# Patient Record
Sex: FEMALE | Race: OTHER | HISPANIC OR LATINO | URBAN - METROPOLITAN AREA
[De-identification: names, ages, dates, MRNs, and addresses within clinical notes are randomized per-mention and may not be internally consistent; named-entity substitution may affect disease eponyms.]

---

## 2021-06-03 ENCOUNTER — EMERGENCY (EMERGENCY)
Facility: HOSPITAL | Age: 46
LOS: 1 days | End: 2021-06-03
Attending: EMERGENCY MEDICINE
Payer: MEDICAID

## 2021-06-03 VITALS
OXYGEN SATURATION: 99 % | WEIGHT: 138.89 LBS | RESPIRATION RATE: 16 BRPM | SYSTOLIC BLOOD PRESSURE: 109 MMHG | HEART RATE: 74 BPM | TEMPERATURE: 98 F | HEIGHT: 57.87 IN | DIASTOLIC BLOOD PRESSURE: 69 MMHG

## 2021-06-03 VITALS
OXYGEN SATURATION: 98 % | HEART RATE: 79 BPM | RESPIRATION RATE: 18 BRPM | DIASTOLIC BLOOD PRESSURE: 67 MMHG | SYSTOLIC BLOOD PRESSURE: 111 MMHG | TEMPERATURE: 98 F

## 2021-06-03 PROCEDURE — 99283 EMERGENCY DEPT VISIT LOW MDM: CPT | Mod: 25

## 2021-06-03 PROCEDURE — 99283 EMERGENCY DEPT VISIT LOW MDM: CPT

## 2021-06-03 PROCEDURE — 73140 X-RAY EXAM OF FINGER(S): CPT | Mod: 26,LT

## 2021-06-03 PROCEDURE — 73140 X-RAY EXAM OF FINGER(S): CPT

## 2021-06-03 RX ORDER — IBUPROFEN 200 MG
600 TABLET ORAL ONCE
Refills: 0 | Status: COMPLETED | OUTPATIENT
Start: 2021-06-03 | End: 2021-06-03

## 2021-06-03 RX ORDER — CEPHALEXIN 500 MG
1 CAPSULE ORAL
Qty: 28 | Refills: 0
Start: 2021-06-03 | End: 2021-06-09

## 2021-06-03 RX ORDER — CEPHALEXIN 500 MG
500 CAPSULE ORAL
Refills: 0 | Status: DISCONTINUED | OUTPATIENT
Start: 2021-06-03 | End: 2021-06-07

## 2021-06-03 RX ADMIN — Medication 500 MILLIGRAM(S): at 23:46

## 2021-06-03 RX ADMIN — Medication 600 MILLIGRAM(S): at 23:46

## 2021-06-03 NOTE — ED PROVIDER NOTE - ATTENDING CONTRIBUTION TO CARE
46 yo female mostly Zimbabwean speaking presents to the ER for evaluation of left 5th digit wound with initial paronychia treated with acid to the site because she thought it was a fungal infection but it was red and swollen, now with eschar over site, no tenderness to fat pad, no swelling, from, films with no bony involvment to dc on abx with outpt hand follow up. 46 yo female mostly Burmese speaking presents to the ER for evaluation of left 5th digit wound with initial paronychia treated with acid to the site because she thought it was a fungal infection but it was red and swollen, now with eschar over site, no tenderness to fat pad, no swelling, from, films with no bony involvement to dc on abx with outpt hand follow up.

## 2021-06-03 NOTE — ED ADULT NURSE NOTE - OBJECTIVE STATEMENT
patient is a 46 y/o F with hx of hypothyroidism who presents to the ED c/o worsening finger infection/pain over the past 5 days. patient states that she noticed 5 days ago a "bubble" on her finger that she believed was a fungal infection that she began self medicating with "an acid treatment from my country". patient is a 44 y/o F with hx of hypothyroidism who presents to the ED c/o worsening finger infection/pain over the past 5 days. patient states that she noticed 5 days ago a "bubble" on her finger that she believed was a fungal infection that she began self medicating with "an acid treatment from my country". leon patient is a 46 y/o F with hx of hypothyroidism who presents to the ED c/o worsening finger infection/pain over the past 5 days. patient states that she noticed 5 days ago a "bubble" on her finger that she believed was a fungal infection that she began self medicating with "an acid treatment from my [place]" that she left soaking on her finger for "awhile". patient also states that she took a few doses of tetracycline that she "just had around the house". patient was wound noted to her left 5th finger that has small area of black tissue surrounded by redness. patient rates pain as 8/10.   patient is a&ox4, PERRL. strong peripheral pulses, strong extremities x4. ambulatory with steady gait. respirations even and unlabored with symmetrical chest rise. abdomen soft, nondistended, nontender. skin otherwise intact  patient denies CP, SOB, h/a, dizziness, weakness, numbness/tingling, vision changes, abd pain, n/v/d/c, urinary symptoms, cough, fever, chills, recent travel, or sick contacts

## 2021-06-03 NOTE — ED PROVIDER NOTE - NSFOLLOWUPINSTRUCTIONS_ED_ALL_ED_FT
1. Follow up with your primary care physician within 2-3days for reevaluation.  2.  Return to the Emergency Department for worsening, progressive or any other concerning symptoms.   3. Take keflex 500 mg 4x a day-  4. -- Please use 650mg Tylenol (also called acetaminophen) every 4 hours & 600mg Motrin (also called Advil or ibuprofen) every 6 hours as needed for pain/discomfort/swelling. You can get these without a prescription. Don't use more than 3500mg of Tylenol in any 24-hour period. Make sure your other prescription/over-the-counter medications don't contain any Tylenol so you don't take too much. If you have any stomach discomfort while taking Motrin, you can use TUMS or Pepcid or Zantac (these can all be bought without a prescription). 1. Follow up with your primary care physician within 2-3days for reevaluation.  2.  Return to the Emergency Department for worsening, progressive or any other concerning symptoms.   3. Take keflex 500 mg 4x a day-  4. -- Please use 650mg Tylenol (also called acetaminophen) every 4 hours & 600mg Motrin (also called Advil or ibuprofen) every 6 hours as needed for pain/discomfort/swelling. You can get these without a prescription. Don't use more than 3500mg of Tylenol in any 24-hour period. Make sure your other prescription/over-the-counter medications don't contain any Tylenol so you don't take too much. If you have any stomach discomfort while taking Motrin, you can use TUMS or Pepcid or Zantac (these can all be bought without a prescription).    follow up with dr Natali Juares    [facility] Physician [facility] [address] [address], [place], [address] [phone]

## 2021-06-03 NOTE — ED PROVIDER NOTE - OBJECTIVE STATEMENT
44 yo female mostly Belarusian speaking presents to the ER for evaluation of left 5th digit wound.   used and pt states that she used acid from her country because she thought it would help her finger.  Pt reports moderate throbbing pain to distal phalynx. with partial thickness burn.

## 2021-06-03 NOTE — ED PROVIDER NOTE - PATIENT PORTAL LINK FT
You can access the FollowMyHealth Patient Portal offered by [facility] by registering at the following website: [email or web address]. By joining [employer]’s FollowMyHealth portal, you will also be able to view your health information using other applications (apps) compatible with our system.

## 2021-06-08 ENCOUNTER — APPOINTMENT (OUTPATIENT)
Dept: ORTHOPEDIC SURGERY | Facility: CLINIC | Age: 46
End: 2021-06-08

## 2021-06-08 PROBLEM — Z00.00 ENCOUNTER FOR PREVENTIVE HEALTH EXAMINATION: Status: ACTIVE | Noted: 2021-06-08

## 2021-07-18 ENCOUNTER — EMERGENCY (EMERGENCY)
Facility: HOSPITAL | Age: 46
LOS: 1 days | Discharge: HOME | End: 2021-07-18
Attending: STUDENT IN AN ORGANIZED HEALTH CARE EDUCATION/TRAINING PROGRAM
Payer: MEDICAID

## 2021-07-18 VITALS
DIASTOLIC BLOOD PRESSURE: 72 MMHG | HEART RATE: 78 BPM | SYSTOLIC BLOOD PRESSURE: 107 MMHG | RESPIRATION RATE: 16 BRPM | OXYGEN SATURATION: 100 % | TEMPERATURE: 98 F

## 2021-07-18 VITALS
OXYGEN SATURATION: 98 % | TEMPERATURE: 99 F | DIASTOLIC BLOOD PRESSURE: 92 MMHG | HEART RATE: 77 BPM | HEIGHT: 57.87 IN | SYSTOLIC BLOOD PRESSURE: 133 MMHG | WEIGHT: 121.25 LBS | RESPIRATION RATE: 16 BRPM

## 2021-07-18 LAB
ALBUMIN SERPL ELPH-MCNC: 4.1 G/DL — SIGNIFICANT CHANGE UP (ref 3.3–5)
ALP SERPL-CCNC: 67 U/L — SIGNIFICANT CHANGE UP (ref 40–120)
ALT FLD-CCNC: 12 U/L — SIGNIFICANT CHANGE UP (ref 10–45)
ANION GAP SERPL CALC-SCNC: 8 MMOL/L — SIGNIFICANT CHANGE UP (ref 5–17)
AST SERPL-CCNC: 16 U/L — SIGNIFICANT CHANGE UP (ref 10–40)
BASOPHILS # BLD AUTO: 0.03 K/UL — SIGNIFICANT CHANGE UP (ref 0–0.2)
BASOPHILS NFR BLD AUTO: 0.5 % — SIGNIFICANT CHANGE UP (ref 0–2)
BILIRUB SERPL-MCNC: 0.2 MG/DL — SIGNIFICANT CHANGE UP (ref 0.2–1.2)
BUN SERPL-MCNC: 13 MG/DL — SIGNIFICANT CHANGE UP (ref 7–23)
CALCIUM SERPL-MCNC: 9 MG/DL — SIGNIFICANT CHANGE UP (ref 8.4–10.5)
CHLORIDE SERPL-SCNC: 107 MMOL/L — SIGNIFICANT CHANGE UP (ref 96–108)
CO2 SERPL-SCNC: 25 MMOL/L — SIGNIFICANT CHANGE UP (ref 22–31)
CREAT SERPL-MCNC: 0.65 MG/DL — SIGNIFICANT CHANGE UP (ref 0.5–1.3)
EOSINOPHIL # BLD AUTO: 0.13 K/UL — SIGNIFICANT CHANGE UP (ref 0–0.5)
EOSINOPHIL NFR BLD AUTO: 2.3 % — SIGNIFICANT CHANGE UP (ref 0–6)
GLUCOSE SERPL-MCNC: 104 MG/DL — HIGH (ref 70–99)
HCT VFR BLD CALC: 36.8 % — SIGNIFICANT CHANGE UP (ref 34.5–45)
HGB BLD-MCNC: 11.5 G/DL — SIGNIFICANT CHANGE UP (ref 11.5–15.5)
IMM GRANULOCYTES NFR BLD AUTO: 0.4 % — SIGNIFICANT CHANGE UP (ref 0–1.5)
LYMPHOCYTES # BLD AUTO: 0.87 K/UL — LOW (ref 1–3.3)
LYMPHOCYTES # BLD AUTO: 15.3 % — SIGNIFICANT CHANGE UP (ref 13–44)
MCHC RBC-ENTMCNC: 26.6 PG — LOW (ref 27–34)
MCHC RBC-ENTMCNC: 31.3 GM/DL — LOW (ref 32–36)
MCV RBC AUTO: 85.2 FL — SIGNIFICANT CHANGE UP (ref 80–100)
MONOCYTES # BLD AUTO: 0.59 K/UL — SIGNIFICANT CHANGE UP (ref 0–0.9)
MONOCYTES NFR BLD AUTO: 10.4 % — SIGNIFICANT CHANGE UP (ref 2–14)
NEUTROPHILS # BLD AUTO: 4.04 K/UL — SIGNIFICANT CHANGE UP (ref 1.8–7.4)
NEUTROPHILS NFR BLD AUTO: 71.1 % — SIGNIFICANT CHANGE UP (ref 43–77)
NRBC # BLD: 0 /100 WBCS — SIGNIFICANT CHANGE UP (ref 0–0)
PLATELET # BLD AUTO: 195 K/UL — SIGNIFICANT CHANGE UP (ref 150–400)
POTASSIUM SERPL-MCNC: 4.3 MMOL/L — SIGNIFICANT CHANGE UP (ref 3.5–5.3)
POTASSIUM SERPL-SCNC: 4.3 MMOL/L — SIGNIFICANT CHANGE UP (ref 3.5–5.3)
PROT SERPL-MCNC: 6.8 G/DL — SIGNIFICANT CHANGE UP (ref 6–8.3)
RBC # BLD: 4.32 M/UL — SIGNIFICANT CHANGE UP (ref 3.8–5.2)
RBC # FLD: 16.3 % — HIGH (ref 10.3–14.5)
SODIUM SERPL-SCNC: 140 MMOL/L — SIGNIFICANT CHANGE UP (ref 135–145)
WBC # BLD: 5.68 K/UL — SIGNIFICANT CHANGE UP (ref 3.8–10.5)
WBC # FLD AUTO: 5.68 K/UL — SIGNIFICANT CHANGE UP (ref 3.8–10.5)

## 2021-07-18 PROCEDURE — 99285 EMERGENCY DEPT VISIT HI MDM: CPT

## 2021-07-18 PROCEDURE — 93010 ELECTROCARDIOGRAM REPORT: CPT

## 2021-07-18 PROCEDURE — 80053 COMPREHEN METABOLIC PANEL: CPT

## 2021-07-18 PROCEDURE — 99284 EMERGENCY DEPT VISIT MOD MDM: CPT | Mod: 25

## 2021-07-18 PROCEDURE — 86703 HIV-1/HIV-2 1 RESULT ANTBDY: CPT

## 2021-07-18 PROCEDURE — 0225U NFCT DS DNA&RNA 21 SARSCOV2: CPT

## 2021-07-18 PROCEDURE — 93005 ELECTROCARDIOGRAM TRACING: CPT

## 2021-07-18 PROCEDURE — 71046 X-RAY EXAM CHEST 2 VIEWS: CPT

## 2021-07-18 PROCEDURE — 71046 X-RAY EXAM CHEST 2 VIEWS: CPT | Mod: 26

## 2021-07-18 PROCEDURE — 84484 ASSAY OF TROPONIN QUANT: CPT

## 2021-07-18 PROCEDURE — 85025 COMPLETE CBC W/AUTO DIFF WBC: CPT

## 2021-07-18 RX ORDER — ACETAMINOPHEN 500 MG
650 TABLET ORAL ONCE
Refills: 0 | Status: COMPLETED | OUTPATIENT
Start: 2021-07-18 | End: 2021-07-18

## 2021-07-18 RX ADMIN — Medication 650 MILLIGRAM(S): at 23:16

## 2021-07-18 NOTE — ED PROVIDER NOTE - PHYSICAL EXAMINATION
GEN - NAD; non-toxic; A+Ox3, speaking full sentences, steady gait   HENT - NC/AT, No visible Ecchymosis, No Abrasions, No Lac/Tears, MMM, no discharge  EYES - EOMI, no conjunctival pallor, no scleral icterus  PULM - CTA B/L,  symmetric breath sounds  CV -  RRR, S1 S2, no murmurs 2+ Pulses B/L UE  GI - NT/ND, soft, no guarding, no rebound, no masses    MSK/EXT- no edema, no gross deformity, warm and well perfused, no calf tenderness/swelling/erythema   NEUROLOGIC - alert, moving all 4 ext with 5/5 Strength

## 2021-07-18 NOTE — ED PROVIDER NOTE - NS ED ROS FT
Constitution: (+) Subjective Fever or chills, No Weight Loss,   Eyes: No visual changes  HEENT: (+) cough, No Discharge, No Rhinorrhea, No URI symptoms  Cardio: (+) Tussive Chest pain, No Palpitations, No Dyspnea  Resp: No SOB, No Wheezing  GI: No abdominal pain, No Nausea, No Vomiting, No Constipation, No Diarrhea  : No burning upon urination, trouble urinating, no foul odor from urine  MSK: No Back pain, No Numbness, No Tingling, No Weakness  Neuro: No Headache, Normal Gait  Skin: No rashes, No Bruising, No Swelling

## 2021-07-18 NOTE — ED PROVIDER NOTE - PATIENT PORTAL LINK FT
You can access the FollowMyHealth Patient Portal offered by [facility] by registering at the following website: [email or web address]. By joining [employer]’s FollowMyHealth portal, you will also be able to view your health information using other applications (apps) compatible with our system.

## 2021-07-18 NOTE — ED PROVIDER NOTE - NSFOLLOWUPINSTRUCTIONS_ED_ALL_ED_FT
You were seen and evaluated in the Emergency Department for your upper respiratory-like symptoms. You were evaluated clinically and with laboratory and imaging studies.    At this time your clinical evaluation and history do not demonstrate any acute, life-threatening medical conditions warranting emergent treatment. However, we strongly recommend you follow up with one of our Primary Care Doctors (or your own) for further evaluation of your symptoms by calling the following number to make an appointment:    [facility] Internal Medicine  General Internal Medicine  [address] [place] [address]  Phone: [phone]    Continue to maintain oral hydration, with plenty of fluids; take Tylenol and Motrin, every 8 hours with food (following the instructions on the medication insert information sheet for dosing) for fever control.     Should you develop new or worsening chest pain, shortness of breath, abdominal pain, fevers, chills, nausea, vomiting, diarrhea, or constipation - please return to the ED for immediate evaluation.     We also strongly encourage you make an appointment with your Primary Care Physician for a comprehensive evaluation of your health.

## 2021-07-18 NOTE — ED PROVIDER NOTE - CLINICAL SUMMARY MEDICAL DECISION MAKING FREE TEXT BOX
ID: [id number] 45 female, hx: Hypothyroidism - presents with 3 days of subjective fevers, chills, nonproductive cough, and chest pain that is present when she is coughing. Exam, presentation, and history concerning for viral bronchitis vs. PNA; will r/o atypical ACS (low HEART score), PTX. Evaluation is NOT consistent with Dissection (2+ Pulses B/L, no chest pain radiating to back; denies any numbness/tingling/weakness), PE (PERC negative). Plan: CBC, CMP, EKG, CXR, Trop, RVP, likely DC if w/u negative. VSS, non-toxic.  ID: [id number] 45 female, hx: Hypothyroidism - presents with 3 days of subjective fevers, chills, nonproductive cough, and chest pain that is present when she is coughing. Exam, presentation, and history concerning for viral bronchitis vs. PNA; will r/o atypical ACS (low HEART score), PTX. Evaluation is NOT consistent with Dissection (2+ Pulses B/L, no chest pain radiating to back; denies any numbness/tingling/weakness), PE (PERC negative). Plan: CBC, CMP, EKG, CXR, Trop, RVP, likely DC if w/u negative. VSS, non-toxic.    NAMRATA Cheng, Attending: reviewed resident note. 45 yoF, PMHx of hypothyroidism presenting with 3 days of cough, subj fever, chest pain (w/ coughing). S/p Covid vaccine. No cardiac hx. No smoking or drug use. Rare social etoh.  looks well, slight cough. Normal RR. Lungs CTA. Non lateralizing neuro exam.  suspect viral bronchitis. R/o PNA, Covid. Screen with trop/ekg but doubt. Not consistent with aortic disease. PERC neg.   likely d/c with supportive care

## 2021-07-18 NOTE — ED PROVIDER NOTE - PROGRESS NOTE DETAILS
Resident Dipak: Preliminary eval without any significant abnormalities or suggestive of any acute pathology. Will discharge home with followup care.

## 2021-07-19 LAB
HCOV PNL SPEC NAA+PROBE: DETECTED
HIV 1 & 2 AB SERPL IA.RAPID: SIGNIFICANT CHANGE UP
RAPID RVP RESULT: DETECTED
SARS-COV-2 RNA SPEC QL NAA+PROBE: SIGNIFICANT CHANGE UP

## 2021-07-19 NOTE — ED ADULT NURSE NOTE - OBJECTIVE STATEMENT
ID: [id number] 45 female, hx: Hypothyroidism - presents with 3 days of subjective fevers, chills, nonproductive cough, and chest pain that is present when she is coughing. Denies any chest pain at rest; denies any SOB, palpitations, abdominal pain, nausea, vomiting, diarrhea, constipation, bloody stools, dysuric symptoms. Denies any hemoptysis; denies any prior history of PE/DVT, no recent surg/hosp, denies any calf tenderness/swelling/erythema.

## 2021-07-19 NOTE — ED POST DISCHARGE NOTE - DETAILS
7/19: giselam with  samantha [id number] to please call back admin line - Shelley Perez PA-C 7/20: used  [id number] Yuridia. Advised hydration and return to the ED if unable to tolerate PO. Anti-pyretics. Return to work 72 hours fever free- DOC Santa